# Patient Record
Sex: MALE
[De-identification: names, ages, dates, MRNs, and addresses within clinical notes are randomized per-mention and may not be internally consistent; named-entity substitution may affect disease eponyms.]

---

## 2020-04-08 ENCOUNTER — HOSPITAL ENCOUNTER (OUTPATIENT)
Dept: HOSPITAL 88 - OR | Age: 35
Discharge: HOME | End: 2020-04-08
Attending: INTERNAL MEDICINE
Payer: COMMERCIAL

## 2020-04-08 VITALS — DIASTOLIC BLOOD PRESSURE: 74 MMHG | SYSTOLIC BLOOD PRESSURE: 110 MMHG

## 2020-04-08 DIAGNOSIS — K64.8: ICD-10-CM

## 2020-04-08 DIAGNOSIS — K62.5: Primary | ICD-10-CM

## 2020-04-08 DIAGNOSIS — K60.2: ICD-10-CM

## 2020-04-08 DIAGNOSIS — R03.0: ICD-10-CM

## 2020-04-08 PROCEDURE — 45378 DIAGNOSTIC COLONOSCOPY: CPT

## 2020-04-08 NOTE — OPERATIVE REPORT
DATE OF PROCEDURE:  04/08/2020

 

SURGEON:  Atif Fiore MD

 

INDICATION FOR COLONOSCOPY:  Rectal bleeding.

 

MEDICATIONS:  The patient was done under MAC please see anesthesiologist's note.

 

PROCEDURE IN DETAIL:  With the patient in left lateral decubitus position, a flexible

fiberoptic Olympus colonoscope was inserted into the rectum with ease and advanced all

the way to the cecum.  It was then withdrawn slowly.  Mucosa overlying the cecum,

ascending colon, transverse, descending, sigmoid, and rectum appeared to be within

normal limits.  On retroflexed position, moderate-sized internal hemorrhoids were noted.

 The scope was then straightened out, it was subsequently withdrawn, and on the way out

an anal fissure was noted at approximately 6 o'clock with the patient in the supine

position.  There was no active bleeding.  The patient tolerated procedure well. 

 

IMPRESSION:  

1. Internal hemorrhoids.

2. Anal fissure.

 

PLAN:  Initiate Anucort HC suppositories b.i.d. x10 days then p.r.n.  A general surgical

opinion with Dr. Jose Eli. 

 

 

 

 

______________________________

Atif Fiore MD

 

[facility]

D:  04/08/2020 13:13:26

T:  04/08/2020 13:24:13

Job #:  [id number]

 

cc:            Jose Eli MD

## 2020-04-08 NOTE — XMS REPORT
Patient Summary Document

                             Created on: 2020



MERCEDEZ GALLAGHER

External Reference #: [id number]

: 1985

Sex: Male



Demographics







                          Address                   [address] [place] [address]  [id number]

 

                          Home Phone                [phone]

 

                          Preferred Language        Unknown

 

                          Marital Status            Unknown

 

                          Holiness Affiliation     Unknown

 

                          Race                      Unknown

 

                          Ethnic Group              Unknown





Author







                          Author                    [facility]

 

                          Address                   Unknown

 

                          Phone                     Unavailable







Support







                Name            Relationship    Address         Phone

 

                    NOONE,  ELSE        PRS                 [address], [place]  [phone]

 

                    NOONE,  ELSE        PRS                 [address], [place]  [phone]







Care Team Providers







                    Care Team Member Name    Role                Phone

 

                          Unavailable               Unavailable







Payers







             Payer Name    Policy Type    Policy Number    Effective Date    Expiration Date







Problems

This patient has no known problems.



Allergies, Adverse Reactions, Alerts







          Allergy Name    Allergy Type    Status    Severity    Reaction(s)    Onset Date    Inactive 

Date                      Treating Clinician        Comments

 

        No Known Allergies    DA      Active    U               2020 00:00:00                     







Medications

This patient has no known medications.

## 2021-05-24 LAB
ANION GAP SERPL CALC-SCNC: 13.2 MMOL/L (ref 8–16)
BASOPHILS # BLD AUTO: 0 10*3/UL (ref 0–0.1)
BASOPHILS NFR BLD AUTO: 0.7 % (ref 0–1)
BUN SERPL-MCNC: 16 MG/DL (ref 7–26)
BUN/CREAT SERPL: 12 (ref 6–25)
CALCIUM SERPL-MCNC: 9.3 MG/DL (ref 8.4–10.2)
CHLORIDE SERPL-SCNC: 105 MMOL/L (ref 98–107)
CO2 SERPL-SCNC: 25 MMOL/L (ref 22–29)
DEPRECATED NEUTROPHILS # BLD AUTO: 2.7 10*3/UL (ref 2.1–6.9)
EGFRCR SERPLBLD CKD-EPI 2021: 60 ML/MIN (ref 60–?)
EOSINOPHIL # BLD AUTO: 0 10*3/UL (ref 0–0.4)
EOSINOPHIL NFR BLD AUTO: 0.7 % (ref 0–6)
ERYTHROCYTE [DISTWIDTH] IN CORD BLOOD: 12.7 % (ref 11.7–14.4)
GLUCOSE SERPLBLD-MCNC: 107 MG/DL (ref 74–118)
HCT VFR BLD AUTO: 39.3 % (ref 38.2–49.6)
HGB BLD-MCNC: 13.2 G/DL (ref 14–18)
LYMPHOCYTES # BLD: 1.1 10*3/UL (ref 1–3.2)
LYMPHOCYTES NFR BLD AUTO: 26.6 % (ref 18–39.1)
MCH RBC QN AUTO: 30.1 PG (ref 28–32)
MCHC RBC AUTO-ENTMCNC: 33.6 G/DL (ref 31–35)
MCV RBC AUTO: 89.5 FL (ref 81–99)
MONOCYTES # BLD AUTO: 0.4 10*3/UL (ref 0.2–0.8)
MONOCYTES NFR BLD AUTO: 8.8 % (ref 4.4–11.3)
NEUTS SEG NFR BLD AUTO: 63 % (ref 38.7–80)
PLATELET # BLD AUTO: 176 X10E3/UL (ref 140–360)
POTASSIUM SERPL-SCNC: 4.2 MMOL/L (ref 3.5–5.1)
RBC # BLD AUTO: 4.39 X10E6/UL (ref 4.3–5.7)
SODIUM SERPL-SCNC: 139 MMOL/L (ref 136–145)

## 2021-05-27 ENCOUNTER — HOSPITAL ENCOUNTER (OUTPATIENT)
Dept: HOSPITAL 88 - OR | Age: 36
Discharge: HOME | End: 2021-05-27
Attending: SURGERY
Payer: SELF-PAY

## 2021-05-27 VITALS — DIASTOLIC BLOOD PRESSURE: 76 MMHG | SYSTOLIC BLOOD PRESSURE: 120 MMHG

## 2021-05-27 PROCEDURE — 80048 BASIC METABOLIC PNL TOTAL CA: CPT

## 2021-05-27 PROCEDURE — 85025 COMPLETE CBC W/AUTO DIFF WBC: CPT

## 2021-05-27 PROCEDURE — 36415 COLL VENOUS BLD VENIPUNCTURE: CPT
